# Patient Record
Sex: MALE
[De-identification: names, ages, dates, MRNs, and addresses within clinical notes are randomized per-mention and may not be internally consistent; named-entity substitution may affect disease eponyms.]

---

## 2024-08-26 PROBLEM — Q67.6 PECTUS EXCAVATUM: Status: ACTIVE | Noted: 2024-08-26

## 2024-08-26 PROBLEM — Z00.00 ENCOUNTER FOR PREVENTIVE HEALTH EXAMINATION: Status: ACTIVE | Noted: 2024-08-26

## 2024-08-26 NOTE — HISTORY OF PRESENT ILLNESS
[FreeTextEntry1] : Mr. YVETTE GRIMM, 24 year old male, ...smoker, w/ hx of ....., who presented for pectus excavatum.   Barium esophagram on 6/4/24 at : - The patient swallowed barium without difficulty.  No esophageal mass or stricture.  No hiatal hernia.  There is mild spontaneous gastroesophageal reflux.  CXR on....   PHQ-2 completed on  Pt presents today for CT Sx consultation, referred by Dr. Cassi Cervantes (PCP)

## 2024-08-26 NOTE — ASSESSMENT
[FreeTextEntry1] :          I have reviewed the patient's medical records and diagnostic images at time of this office consultation and have made the following recommendation: 1.

## 2024-08-29 ENCOUNTER — APPOINTMENT (OUTPATIENT)
Dept: THORACIC SURGERY | Facility: CLINIC | Age: 24
End: 2024-08-29

## 2024-08-29 DIAGNOSIS — Q67.6 PECTUS EXCAVATUM: ICD-10-CM
